# Patient Record
Sex: FEMALE | Race: WHITE | NOT HISPANIC OR LATINO | URBAN - METROPOLITAN AREA
[De-identification: names, ages, dates, MRNs, and addresses within clinical notes are randomized per-mention and may not be internally consistent; named-entity substitution may affect disease eponyms.]

---

## 2018-01-16 ENCOUNTER — IMPORTED ENCOUNTER (OUTPATIENT)
Dept: URBAN - METROPOLITAN AREA CLINIC 50 | Facility: CLINIC | Age: 78
End: 2018-01-16

## 2018-01-26 ENCOUNTER — IMPORTED ENCOUNTER (OUTPATIENT)
Dept: URBAN - METROPOLITAN AREA CLINIC 50 | Facility: CLINIC | Age: 78
End: 2018-01-26

## 2018-02-01 ENCOUNTER — IMPORTED ENCOUNTER (OUTPATIENT)
Dept: URBAN - METROPOLITAN AREA CLINIC 50 | Facility: CLINIC | Age: 78
End: 2018-02-01

## 2018-03-20 ENCOUNTER — IMPORTED ENCOUNTER (OUTPATIENT)
Dept: URBAN - METROPOLITAN AREA CLINIC 50 | Facility: CLINIC | Age: 78
End: 2018-03-20

## 2018-08-28 ENCOUNTER — IMPORTED ENCOUNTER (OUTPATIENT)
Dept: URBAN - METROPOLITAN AREA CLINIC 50 | Facility: CLINIC | Age: 78
End: 2018-08-28

## 2019-02-07 ENCOUNTER — IMPORTED ENCOUNTER (OUTPATIENT)
Dept: URBAN - METROPOLITAN AREA CLINIC 50 | Facility: CLINIC | Age: 79
End: 2019-02-07

## 2019-02-07 NOTE — PATIENT DISCUSSION
"""Follow ERM w/o surgery with Dr. Leana Marcano. Call if vision decreases or distortion increases.  """

## 2021-04-18 ASSESSMENT — VISUAL ACUITY
OS_CC: 20/20
OD_CC: J1+
OD_BAT: 20/70
OS_CC: J1+
OS_CC: 20/25
OD_OTHER: 20/70. 20/80.
OD_CC: 20/40
OD_CC: 20/40
OS_CC: J1
OD_CC: J1
OD_OTHER: 20/70. 20/80.
OD_CC: 20/30
OS_CC: 20/20-2
OD_BAT: 20/70

## 2021-04-18 ASSESSMENT — TONOMETRY
OD_IOP_MMHG: 10
OS_IOP_MMHG: 10
OS_IOP_MMHG: 10
OD_IOP_MMHG: 10